# Patient Record
Sex: FEMALE | URBAN - NONMETROPOLITAN AREA
[De-identification: names, ages, dates, MRNs, and addresses within clinical notes are randomized per-mention and may not be internally consistent; named-entity substitution may affect disease eponyms.]

---

## 2023-11-01 ENCOUNTER — APPOINTMENT (RX ONLY)
Dept: URBAN - NONMETROPOLITAN AREA CLINIC 1 | Facility: CLINIC | Age: 43
Setting detail: DERMATOLOGY
End: 2023-11-01

## 2023-11-01 DIAGNOSIS — Z41.9 ENCOUNTER FOR PROCEDURE FOR PURPOSES OTHER THAN REMEDYING HEALTH STATE, UNSPECIFIED: ICD-10-CM

## 2023-11-01 PROCEDURE — ? KTP LASER

## 2023-11-01 PROCEDURE — ? ADDITIONAL NOTES

## 2023-11-01 ASSESSMENT — LOCATION SIMPLE DESCRIPTION DERM: LOCATION SIMPLE: NOSE

## 2023-11-01 ASSESSMENT — LOCATION DETAILED DESCRIPTION DERM: LOCATION DETAILED: NASAL TIP

## 2023-11-01 ASSESSMENT — LOCATION ZONE DERM: LOCATION ZONE: NOSE

## 2023-11-01 NOTE — PROCEDURE: KTP LASER
Price (Use Numbers Only, No Special Characters Or $): 391
Post-Care Instructions: Post care instructions reviewed in detail with pt. \\n\\nPt instructed to protect from the sun with a broad spectrum, physical sunscreen and avoid direct sun exposure if possible.  Apply gentle moisturizer or laser balm to treated areas, especially at night for at least 2 to 4 days post tx.  Avoid extremes in heat, intense workouts, and application of makeup for 24 hours post tx.  Do not scrub, exfoliate, or pick at the skin during the healing process and avoid topical retinols until fully healed.  \\n\\Peggy pt questions addressed and pt verbalized understanding.
Fluence (J/Cm2): 15
Total Pulses: 872
Device Serial Number (Optional): contact cooler level 2
Spot Size: 5 mm
Laser Type: KTP laser 532nm
Post-Procedure Instructions: Topical numbing, if applied, was removed with a warm towel prior to tx. \\n\\nAppropriate eye protection placed on pt and pt instructed to keep eyes closed during tx. \\n\\nTest pulse performed to determine appropriate settings; endpoint vessel disappearance or 1-2 shades darkening. \\n\\nPost procedure Alastin nectar and silkshield spf applied to tx area.
Repetition Rate (Hz): 1
Consent: Written consent verbally reviewed by RN, signed by pt, and a written copy was offered to pt.  \\n\\nRisks reviewed including but not limited to pain, crusting, scabbing, blistering, scarring, darker or lighter pigmentary change which can be permanent, incidental hair removal, bruising, edema, pustules and/or pimples, infection, re-activation of cold sores, incomplete removal of lesions, and lack of desired result. \\n\\nNo guarantees can be made and results vary pt to pt.  Pt understands multiple txs may be necessary to achieve desired result and not all unwanted lesions may be removed. \\n\\nPt understands the tx is cosmetic in nature and not covered by insurance.
Treated Area: large area
Detail Level: Zone
Immediate Endpoint: immediate vessel disappearance
Spot Size: 3 mm
Treated Area: small area
Fluence (J/Cm2): 10
Post-Procedure Instructions: Contact cooler removed for under eye and nose areas.

## 2023-11-01 NOTE — PROCEDURE: ADDITIONAL NOTES
Detail Level: Simple
Render Risk Assessment In Note?: no
Additional Notes: Written copy of post care instructions provided to pt, reviewed by RN, and signed by pt.  See attached photo for complete instructions.